# Patient Record
Sex: MALE | Race: WHITE | URBAN - METROPOLITAN AREA
[De-identification: names, ages, dates, MRNs, and addresses within clinical notes are randomized per-mention and may not be internally consistent; named-entity substitution may affect disease eponyms.]

---

## 2017-07-19 VITALS
HEART RATE: 61 BPM | SYSTOLIC BLOOD PRESSURE: 150 MMHG | DIASTOLIC BLOOD PRESSURE: 89 MMHG | TEMPERATURE: 97.9 F | OXYGEN SATURATION: 100 % | RESPIRATION RATE: 16 BRPM

## 2017-07-19 PROCEDURE — 99282 EMERGENCY DEPT VISIT SF MDM: CPT

## 2017-07-20 ENCOUNTER — HOSPITAL ENCOUNTER (EMERGENCY)
Facility: CLINIC | Age: 22
Discharge: HOME | End: 2017-07-20
Attending: NURSE PRACTITIONER | Admitting: NURSE PRACTITIONER
Payer: COMMERCIAL

## 2017-07-20 DIAGNOSIS — R20.8 SKIN PAIN: ICD-10-CM

## 2017-07-20 NOTE — ED AVS SNAPSHOT
[facility] Emergency Department    [address] [place] [address]    Phone:  [phone]    Fax:  [phone]                                       Willy Park   MRN: [id number]    Department:  [facility] Emergency Department   Date of Visit:  7/19/2017           Patient Information     Date Of Birth          1995        Your diagnoses for this visit were:     Skin pain        You were seen by Sameer Esteban, EMBER CNP.      Follow-up Information     Follow up with None In 1 week.    Why:  if continuned symptoms or sooner if worsening        Discharge Instructions       Take ibuprofen 600 mg every 6 hours as needed for discomfort.  Try heat.  Use a warm compress for 20 minutes at a time up to 4-6 times a day.  Follow-up with your primary care provider in 5-7 days if no improvement sooner if you develop area of redness, swelling, or fevers    24 Hour Appointment Hotline       To make an appointment at any [facility] clinic, call [phone] ([phone]). If you don't have a family doctor or clinic, we will help you find one. [facility] clinics are conveniently located to serve the needs of you and your family.             Review of your medicines      Notice     You have not been prescribed any medications.            Orders Needing Specimen Collection     None      Pending Results     No orders found from 7/18/2017 to 7/21/2017.            Pending Culture Results     No orders found from 7/18/2017 to 7/21/2017.            Pending Results Instructions     If you had any lab results that were not finalized at the time of your Discharge, you can call the ED Lab Result RN at [phone]. You will be contacted by this team for any positive Lab results or changes in treatment. The nurses are available 7 days a week from 10A to 6:30P.  You can leave a message 24 hours per day and they will return your call.        Test Results From Your Hospital Stay                Clinical Quality Measure: Blood Pressure Screening     Your blood pressure was checked while you were in the emergency department today. The last reading we obtained was  BP: 150/89 . Please read the guidelines below about what these numbers mean and what you should do about them.  If your systolic blood pressure (the top number) is less than 120 and your diastolic blood pressure (the bottom number) is less than 80, then your blood pressure is normal. There is nothing more that you need to do about it.  If your systolic blood pressure (the top number) is 120-139 or your diastolic blood pressure (the bottom number) is 80-89, your blood pressure may be higher than it should be. You should have your blood pressure rechecked within a year by a primary care provider.  If your systolic blood pressure (the top number) is 140 or greater or your diastolic blood pressure (the bottom number) is 90 or greater, you may have high blood pressure. High blood pressure is treatable, but if left untreated over time it can put you at risk for heart attack, stroke, or kidney failure. You should have your blood pressure rechecked by a primary care provider within the next 4 weeks.  If your provider in the emergency department today gave you specific instructions to follow-up with your doctor or provider even sooner than that, you should follow that instruction and not wait for up to 4 weeks for your follow-up visit.        Thank you for choosing [facility]       Thank you for choosing [facility] for your care. Our goal is always to provide you with excellent care. Hearing back from our patients is one way we can continue to improve our services. Please take a few minutes to complete the written survey that you may receive in the mail after you visit with us. Thank you!        [email or web address] Information     [employer] lets you send messages to your doctor, view your test results, renew your prescriptions, schedule appointments and more. To sign  "up, go to [email or web address] . Click on \"Log in\" on the left side of the screen, which will take you to the Welcome page. Then click on \"Sign up Now\" on the right side of the page.     You will be asked to enter the access code listed below, as well as some personal information. Please follow the directions to create your username and password.     Your access code is: [email or web address]  Expires: 10/18/2017 12:23 AM     Your access code will  in 90 days. If you need help or a new code, please call your [facility] clinic or [phone].        Care EveryWhere ID     This is your Care EveryWhere ID. This could be used by other organizations to access your [facility] medical records  [id number]        Equal Access to Services     JEB GIL : Monisha Park, wayne barroso, nathan dowell, juan carlos chau. So [facility] [phone].    ATENCIÓN: Si habla español, tiene a moffett disposición servicios gratuitos de asistencia lingüística. Alejandrina al [phone].    We comply with applicable federal civil rights laws and [place] laws. We do not discriminate on the basis of race, color, national origin, age, disability sex, sexual orientation or gender identity.            After Visit Summary       This is your record. Keep this with you and show to your community pharmacist(s) and doctor(s) at your next visit.                  "

## 2017-07-20 NOTE — ED AVS SNAPSHOT
[facility] Emergency Department    [address]    [facility] [address]    Phone:  [phone]    Fax:  [phone]                                       Willy Park   MRN: [id number]    Department:  [facility] Emergency Department   Date of Visit:  7/19/2017           After Visit Summary Signature Page     I have received my discharge instructions, and my questions have been answered. I have discussed any challenges I see with this plan with the nurse or doctor.    ..........................................................................................................................................  Patient/Patient Representative Signature      ..........................................................................................................................................  Patient Representative Print Name and Relationship to Patient    ..................................................               ................................................  Date                                            Time    ..........................................................................................................................................  Reviewed by Signature/Title    ...................................................              ..............................................  Date                                                            Time

## 2017-07-20 NOTE — ED NOTES
Pt c/o painful lump above rigt eyebrow since Monday. Denies trauma.    Pt A&O x 3, CMS x 3, ABCD's adequate in triage

## 2017-07-20 NOTE — ED NOTES
Pt discharged home. Verbal and written instructions given and explained. All questions given and explained.

## 2017-07-20 NOTE — DISCHARGE INSTRUCTIONS
Take ibuprofen 600 mg every 6 hours as needed for discomfort.  Try heat.  Use a warm compress for 20 minutes at a time up to 4-6 times a day.  Follow-up with your primary care provider in 5-7 days if no improvement sooner if you develop area of redness, swelling, or fevers

## 2021-09-02 NOTE — ED PROVIDER NOTES
History     Chief Complaint:  Lump    HPI   Willy Park is a 22 year old male who presents with a lump above his right eyebrow. The patient states that he first noticed the lump on Monday. He remarks that the lump has not increased in size and he has had no pain increase. The patient states that the lump is painful when he touches it or lays on it, but it is not itchy and he has not noticed any redness. He states that it mildly throbs intermittently. He has had no other symptoms including fever, nausea, or rash.     Allergies:  No known drug allergies.      Medications:    The patient is currently on no regular medications.     Past Medical History:    History reviewed.  No significant past medical history.      Past Surgical History:    History reviewed. No pertinent past surgical history.     Family History:    History reviewed. No pertinent family history.     Social History:  Marital Status: Single  Presents to the ED with girlfriend  Tobacco Use: Former smoker  Alcohol Use: Occasionally    Review of Systems   Skin:        Positive for lump   All other systems reviewed and are negative.    Physical Exam   First Vitals:  BP: 150/89  Pulse: 61  Heart Rate: 61  Temp: 97.9  F (36.6  C)  Resp: 16  SpO2: 100 %      Physical Exam  General: Alert, No obvious discomfort, well kept   HENT:  Normal voice, No lymphadenopathy  Eyes:  The pupils are equal, round, and reactive to light, Conjunctiva normal, No scleral icterus   Neck:  Normal range of motion  CV:  Normal Pulses  Resp:  Non-labored, No cough  MS:  Normal muscular tone, moves all extremities  Skin:  No rash or acute skin lesions noted, patient has no palpable lesion he does have a very slight area that appears to be an older contusion on right side forehead just below temporal region.  There is no deformity or crepitus.  Neuro: Speech is normal and fluent  Psych:  Awake. Alert.  Normal affect.  Appropriate interactions. Good eye contact    Emergency  Department Course     Emergency Department Course:  Nursing notes and vitals reviewed.  I performed an exam of the patient as documented above.   Findings and plan explained to the patient. Patient discharged home with instructions regarding supportive care, medications, and reasons to return. The importance of close follow-up was reviewed.     Impression & Plan      Medical Decision Making:  Willy Park is a 22 year old male who presents today for evaluation of painful area of his right forehead.  There is no indication for lesion, foreign body, or rash.  Does not appear to be shingles.  No indication for temporal arteritis. Exam is most consistent with soft tissue/contusion type injury.  He is advised to use ibuprofen and heat.  He will follow up with primary care provider in 5-7 days if no improvement sooner if worsening.  Return to the ER.  He developed redness, swelling, fevers, or further concerns.    Diagnosis:    ICD-10-CM    1. Skin pain R20.8        Disposition:  discharged to home    Scribe disclosure:   Travis KIMBALL, am serving as a scribe on 7/20/2017 at 12:10 AM to personally document services performed by EMBER Baltazar, CNP based on my observations and the provider's statements to me.    [facility] EMERGENCY DEPARTMENT       Sameer Esteban APRN CNP  07/20/17 0036     Complex Repair And Graft Additional Text (Will Appearing After The Standard Complex Repair Text): The complex repair was not sufficient to completely close the primary defect. The remaining additional defect was repaired with the graft mentioned below.